# Patient Record
Sex: FEMALE | Race: BLACK OR AFRICAN AMERICAN | URBAN - METROPOLITAN AREA
[De-identification: names, ages, dates, MRNs, and addresses within clinical notes are randomized per-mention and may not be internally consistent; named-entity substitution may affect disease eponyms.]

---

## 2018-01-29 ENCOUNTER — OFFICE VISIT (OUTPATIENT)
Dept: OTOLARYNGOLOGY | Facility: CLINIC | Age: 63
End: 2018-01-29

## 2018-01-29 VITALS
SYSTOLIC BLOOD PRESSURE: 138 MMHG | BODY MASS INDEX: 37.95 KG/M2 | TEMPERATURE: 98 F | WEIGHT: 201 LBS | HEIGHT: 61 IN | DIASTOLIC BLOOD PRESSURE: 88 MMHG

## 2018-01-29 DIAGNOSIS — R04.0 NOSEBLEED: Primary | ICD-10-CM

## 2018-01-29 PROCEDURE — 99212 OFFICE O/P EST SF 10 MIN: CPT | Performed by: OTOLARYNGOLOGY

## 2018-01-29 PROCEDURE — 99203 OFFICE O/P NEW LOW 30 MIN: CPT | Performed by: OTOLARYNGOLOGY

## 2018-01-29 PROCEDURE — 30901 CONTROL OF NOSEBLEED: CPT | Performed by: OTOLARYNGOLOGY

## 2018-01-29 RX ORDER — TIMOLOL MALEATE 5 MG/ML
SOLUTION OPHTHALMIC
Refills: 3 | COMMUNITY
Start: 2017-11-09

## 2018-01-29 RX ORDER — CEPHALEXIN 500 MG/1
500 CAPSULE ORAL EVERY 8 HOURS
Qty: 21 CAPSULE | Refills: 0 | Status: SHIPPED | OUTPATIENT
Start: 2018-01-29 | End: 2018-02-26 | Stop reason: ALTCHOICE

## 2018-01-29 RX ORDER — LATANOPROST 50 UG/ML
SOLUTION/ DROPS OPHTHALMIC
Refills: 3 | COMMUNITY
Start: 2017-12-05

## 2018-01-30 NOTE — PROGRESS NOTES
Randa Cifuentes is a 65year old female. Patient presents with:  Nose Problem: daily nosebleeds from left nostril since Dominik 10      HISTORY OF PRESENT ILLNESS    She presents with a history of nosebleeds as started in early January.   Things have worsened si Tremors. Psych Negative Anxiety and depression. Integumentary Negative Frequent skin infections, pigment change and rash. Hema/Lymph Negative Easy bleeding and easy bruising.            PHYSICAL EXAM    /88 (BP Location: Right arm, Patient Posit plexus. The procedure was performed on the left side. Bleeding site/vessels were treated with AgNO3 cauterization. Anesthesia used was topical Lidocaine/epinephrine 4%/1:58572. Small Merocel sponge was placed to protect this area post cauterization.   No

## 2018-02-03 ENCOUNTER — TELEPHONE (OUTPATIENT)
Dept: OTOLARYNGOLOGY | Facility: CLINIC | Age: 63
End: 2018-02-03

## 2018-02-26 ENCOUNTER — OFFICE VISIT (OUTPATIENT)
Dept: OTOLARYNGOLOGY | Facility: CLINIC | Age: 63
End: 2018-02-26

## 2018-02-26 VITALS
SYSTOLIC BLOOD PRESSURE: 146 MMHG | DIASTOLIC BLOOD PRESSURE: 96 MMHG | TEMPERATURE: 98 F | HEIGHT: 66.5 IN | WEIGHT: 200 LBS | BODY MASS INDEX: 31.76 KG/M2

## 2018-02-26 DIAGNOSIS — R04.0 NOSEBLEED: Primary | ICD-10-CM

## 2018-02-26 PROCEDURE — 99212 OFFICE O/P EST SF 10 MIN: CPT | Performed by: OTOLARYNGOLOGY

## 2018-02-26 PROCEDURE — 99214 OFFICE O/P EST MOD 30 MIN: CPT | Performed by: OTOLARYNGOLOGY

## 2018-02-27 NOTE — PROGRESS NOTES
Yessy Bishop is a 65year old female. Patient presents with: Follow - Up: regarding nosebleeds, improvement in symptoms since last visit       [facility]  She presents with a history of nosebleeds as started in early January.   Things hav ENMT Negative Drooling. Eyes Negative Blurred vision and vision changes. Respiratory Negative Dyspnea and wheezing. Cardio Negative Chest pain, irregular heartbeat/palpitations and syncope. GI Negative Abdominal pain and diarrhea.    Endocrine Neg left.  No obvious blood vessels.        Current Outpatient Prescriptions:   •  latanoprost 0.005 % Ophthalmic Solution, INT ONE GTT IN OU QPM, Disp: , Rfl: 3  •  Timolol Maleate 0.5 % Ophthalmic Gel Forming Solution, INT ONE GTT IN OU QAM, Disp: , Rfl: 3  A

## (undated) NOTE — LETTER
Cherelle Thomas Do  [address] [place] [address]       01/30/18        Patient: Greg Floyd   YOB: 1955   Date of Visit: 1/29/2018       Dear  Dr. Zeke Fox DO,      Thank you for referring Greg Floyd to my practice.   Please fin